# Patient Record
(demographics unavailable — no encounter records)

---

## 2024-10-11 NOTE — HISTORY OF PRESENT ILLNESS
[FreeTextEntry1] : type: 2 diabetes since: 2017 number of years on insulin: none diagnosed by: routine labs current severity: suboptimal FH of diabetes: brother, mother complications: denies   interval history has 2 grandkids, boy 1 year old and girl 1 year old, in [place]  labs reviewed- 10/2024 a1c 6.7, tgl 174, ldl 45, tfts normal, marie neg  regimen off metformin due to possible aches and pains from it ozempic 1mg weekly- no issues, having daily BMs  FS in office: 109  diet: having some dietary indiscretion exercise: active, not working out, plan weight: lost some weight  eye doctor: [facility], Dr. Guerra, 3/2024 neuropathy: yes CKD: none other (PVD/MI/CVA): none non smoker

## 2024-10-11 NOTE — PHYSICAL EXAM
[Alert] : alert [Well Nourished] : well nourished [No Acute Distress] : no acute distress [Well Developed] : well developed [Normal Sclera/Conjunctiva] : normal sclera/conjunctiva [EOMI] : extra ocular movement intact [No Proptosis] : no proptosis [Normal Oropharynx] : the oropharynx was normal [Thyroid Not Enlarged] : the thyroid was not enlarged [No Respiratory Distress] : no respiratory distress [No Accessory Muscle Use] : no accessory muscle use [Clear to Auscultation] : lungs were clear to auscultation bilaterally [Normal S1, S2] : normal S1 and S2 [Normal Rate] : heart rate was normal [Regular Rhythm] : with a regular rhythm [No Edema] : no peripheral edema [Pedal Pulses Normal] : the pedal pulses are present [Normal Bowel Sounds] : normal bowel sounds [Not Tender] : non-tender [Not Distended] : not distended [Soft] : abdomen soft [No Stigmata of Cushings Syndrome] : no stigmata of Cushings Syndrome [Normal Gait] : normal gait [Normal Strength/Tone] : muscle strength and tone were normal [No Rash] : no rash [Normal Reflexes] : deep tendon reflexes were 2+ and symmetric [No Tremors] : no tremors [Oriented x3] : oriented to person, place, and time [Normal Affect] : the affect was normal [Normal Mood] : the mood was normal [Acanthosis Nigricans] : no acanthosis nigricans [Delayed in the Right Toes] : normal in the toes [Delayed in the Left Toes] : normal in the toes

## 2024-10-11 NOTE — ASSESSMENT
[Long Term Vascular Complications] : long term vascular complications of diabetes [Carbohydrate Consistent Diet] : carbohydrate consistent diet [Importance of Diet and Exercise] : importance of diet and exercise to improve glycemic control, achieve weight loss and improve cardiovascular health [Retinopathy Screening] : Patient was referred to ophthalmology for retinopathy screening [FreeTextEntry1] : 55M obese, w/ T2DM w/o complications and dyslipidemia and right sided thyroid nodule here for follow up rtc in 6 months with labs  sono pending  T2DM - suboptimal but again due to diet indiscretion. goal A1c<6.5 based on age. Likely due to dietary indiscretion. Check sugars at least 1-2x daily.  up to date with eye doctor off metformin due to having aches and pains continue ozempic 1mg weekly, for some weight loss, continue colace, stop 2weeks before a procedure  Dyslipidemia- work on diet and exercise. no hx of early heart disease in family. continue with atorvastatin 20mg daily  R sided thyroid nodule- subcm nodule. repeat ultrasound next time   Obesity- BMI 30. needs to work on diet and exercise

## 2025-02-12 NOTE — HISTORY OF PRESENT ILLNESS
[FreeTextEntry8] : Pt. c/o chest congestion/ indigestion/ woke up choking/ vomiting. Pt also c/o rash arm/torso/ b/l legs.    4 d hx of indigestion.  pt states Sat. night developed excessive coughing in the evening and now c continuous cough intermittently productive cough c green phlegm  Denies fever  +ve chills  Hx of rash ant/post trunk and LE B/L that has since improved   pt reports mid to lower back pain   Denies radiation of pain into LE B/L  Denies paresthesia LE B/L

## 2025-02-12 NOTE — PHYSICAL EXAM
[No Acute Distress] : no acute distress [Well Nourished] : well nourished [Well Developed] : well developed [Well-Appearing] : well-appearing [EOMI] : extraocular movements intact [Normal Outer Ear/Nose] : the outer ears and nose were normal in appearance [No JVD] : no jugular venous distention [Normal Rate] : normal rate  [Regular Rhythm] : with a regular rhythm [Normal S1, S2] : normal S1 and S2 [No Edema] : there was no peripheral edema [Non-distended] : non-distended [No CVA Tenderness] : no CVA  tenderness [Grossly Normal Strength/Tone] : grossly normal strength/tone [Coordination Grossly Intact] : coordination grossly intact [Speech Grossly Normal] : speech grossly normal [Normal Affect] : the affect was normal [Normal Mood] : the mood was normal [Normal Insight/Judgement] : insight and judgment were intact [de-identified] : +ve LLL wheeze o/w CTA B/L

## 2025-05-21 NOTE — ASSESSMENT
[Long Term Vascular Complications] : long term vascular complications of diabetes [Carbohydrate Consistent Diet] : carbohydrate consistent diet [Importance of Diet and Exercise] : importance of diet and exercise to improve glycemic control, achieve weight loss and improve cardiovascular health [Retinopathy Screening] : Patient was referred to ophthalmology for retinopathy screening [FreeTextEntry1] : 55M obese, w/ T2DM w/ nonobs CAD and dyslipidemia and right sided thyroid nodule here for follow up rtc in august 13th to see me to see what is happening with myalgias and repeat lipids on less lipitor  Joint pains/myalgias- related to statin? cut back to lipitor 40mg. will be seeing rheumatologist soon to r/o RA etc try coQ10  T2DM - close to goal. goal A1c<6.5 based on age.  annual visit w/ the eye doctor off metformin due to having aches and pains continue ozempic 1mg weekly, for some weight loss, continue colace, stop 2weeks before a procedure  Dyslipidemia- work on diet and exercise. no hx of early heart disease in family. change to atorvastatin 40mg with coQ10   R sided thyroid nodule- subcm nodule. last sono 2024, repeat 2026  Obesity- BMI 30. needs to work on diet and exercise

## 2025-05-21 NOTE — HISTORY OF PRESENT ILLNESS
[FreeTextEntry1] : here for T2DM  type: 2 diabetes since: 2017 number of years on insulin: none diagnosed by: routine labs current severity: suboptimal FH of diabetes: brother, mother complications: denies   interval history has 2 grandkids, boy 1 year old and girl 1 year old, in [place]  labs reviewed- 5/2025 a1c 6.7, tgl 144, ldl 35, hdl 32, tfts normal, marie neg  having joint pains saw ortho joint pains help with the steroid injection will be see rheumatologist getting rash occ as well over the last few years   regimen ozempic 1mg weekly- no issues, having daily BMs off metformin due to possible aches and pains from it  FS in office: 167  diet: having some dietary indiscretion exercise: active, not working out, plan weight: lost some weight  eye doctor: [facility], Dr. Guerra, 3/2024 neuropathy: yes CKD: none other (PVD/MI/CVA): CAD non smoker

## 2025-06-04 NOTE — ASSESSMENT
[FreeTextEntry1] : 56 y/o male w/ HLD, T2DM, CAD referred to rheumatology for polyarthralgia. Pt has been having recurrent R shoulder pains. Pt had MRI of neck and R shoulder. Pt has tried naproxen PRN without help. Pt had resolution of pains with medrol pack with return of symptoms off the medication. Pt has been having joint pains of hands and knees with change in shape of DIPs. Pt reports having b/l ankles/feet stiffness, numbness, improved with medrol pack. Pt reports rare episodes of diffuse rash of the body of arms and torso (once every 2 years) treated with steroids.  Pt has polyarthralgia with significant improvement with steroids. MR of R shoulder and neck show clear mechanical changes. Pt has Heberden's nodes, which are signs of OA rather than RA. I have low concerns for inflammatory arthritis. Steroids is effective for any process that causes inflammation including OA.  - Obtain labwork to evaluate for signs of RA - Obtain XR b/l hands, knees, ankles, feet. If pains refractory by next visit, will consider MRIs for further evaluation for inflammatory arthritis. - Rx meloxicam 7.5mg up to BID PRN to try instead of naproxen PRN. I advised that the NSAID should be taken with food.  In addition while taking the prescribed NSAID, no over the counter or other NSAIDs should be used, such as ibuprofen (Motrin or Advil) or naproxen (Aleve) as this can cause stomach upset or other side effects.  If needed for fever or breakthrough pain Tylenol can be used. - Rx medrol pack to be used JUDICIOUSLY only if refractory. - Pt knows to consider follow up with ortho for consideration for joint injections. - Pt is pending PT - Will contact pt with results. RTC 2 months.

## 2025-06-04 NOTE — PHYSICAL EXAM
[TextEntry] : GENERAL: Appears in no acute distress  HEENT: EOMI, PERRLA. No conjunctival erythema. Moist mucous membranes. No nasopharyngeal ulcers  NECK: Supple, no cervical lymphadenopathy, no thyromegaly  CARDIOVASCULAR: RRR. S1, S2 auscultated. No murmurs or rubs.  PULMONARY: Clear to auscultation b/l, no wheezes, rales, or crackles  ABDOMINAL: Soft, nontender, nondistended. Bowel sounds present. No organomegaly.  MSK:  No active synovitis, swelling, erythema, or warmth.  No joint tenderness to palpation.  R 3rd and L 5th mod Heberden's nodes. SKIN: No lesions or rashes  NEURO: No focal deficits. PSYCH: AAOx3. Normal affect and thought process.

## 2025-06-04 NOTE — HISTORY OF PRESENT ILLNESS
[FreeTextEntry1] : 56 y/o male w/ HLD, T2DM, CAD referred to rheumatology for joint pains of hands and knees.  Pt has been having recurrent R shoulder pains. Pt had MRI of neck and R shoulder. Pt has tried naproxen PRN without help. Pt had resolution of pains with medrol pack with return of symptoms off the medication. Pt has been having joint pains of hands and knees with change in shape of DIPs. Pt reports having b/l ankles/feet stiffness, numbness, improved with medrol pack. Pt reports rare episodes of diffuse rash of the body of arms and torso (once every 2 years) treated with steroids.  WORKUP: MR R shoulder (5/2025): Mild RCT and possible signs of frozen shoulder MR C-spine (5/2025): Multilevel DDD/DJD with mild-mod spinal stenosis

## 2025-06-24 NOTE — HISTORY OF PRESENT ILLNESS
[FreeTextEntry1] : 55 yo M with PMH CAD who presents for discussion of CRC screening.    Patient denies nausea, vomiting, diarrhea, constipation, abdominal pain.  Also denies coffee-ground emesis, hematemesis, hematochezia, melena.  Denies prior travel.  Denies unintentional weight loss.  Had passed blood in the stool in the past many many months ago.  Currently without blood in the stool.  Mother diagnosed with colorectal cancer at age 54 Brother diagnosed with leukemia

## 2025-06-24 NOTE — ASSESSMENT
[FreeTextEntry1] :  Reviewed labs in system and prior notes. Discussed risks and benefits of procedure including infection, bleeding, perforation.  Patient is in agreement with plan for procedure.  Preparation sent to pharmacy.  Discussed management of anticoagulation and GLP-1 medications.

## 2025-06-24 NOTE — PHYSICAL EXAM
[Alert] : alert [Normal Voice/Communication] : normal voice/communication [Healthy Appearing] : healthy appearing [No Acute Distress] : no acute distress [Sclera] : the sclera and conjunctiva were normal [Hearing Threshold Finger Rub Not Keith] : hearing was normal [Normal Lips/Gums] : the lips and gums were normal [Oropharynx] : the oropharynx was normal [Normal Appearance] : the appearance of the neck was normal [No Neck Mass] : no neck mass was observed [No Respiratory Distress] : no respiratory distress [No Acc Muscle Use] : no accessory muscle use [Respiration, Rhythm And Depth] : normal respiratory rhythm and effort [Auscultation Breath Sounds / Voice Sounds] : lungs were clear to auscultation bilaterally [Heart Rate And Rhythm] : heart rate was normal and rhythm regular [Normal S1, S2] : normal S1 and S2 [Murmurs] : no murmurs [Bowel Sounds] : normal bowel sounds [No Masses] : no abdominal mass palpated [Abdomen Tenderness] : non-tender [Abdomen Soft] : soft [] : no hepatosplenomegaly [Oriented To Time, Place, And Person] : oriented to person, place, and time